# Patient Record
Sex: FEMALE | Race: WHITE | URBAN - NONMETROPOLITAN AREA
[De-identification: names, ages, dates, MRNs, and addresses within clinical notes are randomized per-mention and may not be internally consistent; named-entity substitution may affect disease eponyms.]

---

## 2020-11-24 ENCOUNTER — POST-OPERATIVE VISIT (OUTPATIENT)
Dept: URBAN - NONMETROPOLITAN AREA CLINIC 19 | Facility: CLINIC | Age: 75
End: 2020-11-24
Payer: MEDICARE

## 2020-11-24 DIAGNOSIS — Z96.1 PRESENCE OF INTRAOCULAR LENS: Primary | ICD-10-CM

## 2020-11-24 ASSESSMENT — VISUAL ACUITY
OS: 20/25
OD: 20/25

## 2020-11-24 ASSESSMENT — INTRAOCULAR PRESSURE
OD: 14
OS: 14

## 2020-11-24 NOTE — IMPRESSION/PLAN
Impression: S/P CE/Premium IOL OS - 62 Days. Presence of intraocular lens  Z96.1. Post operative instructions reviewed - Plan: Patient is doing  well status post CE IOL with Lensx /multi-focal IOL, however there is residual postoperative refractive error. We discussed that this can be corrected with [address]  3 months after date of surgery,  Patient to return with Dr Raina Parra for Sumit 86 eval in 1-2 months.

## 2021-01-18 ENCOUNTER — OFFICE VISIT (OUTPATIENT)
Dept: URBAN - METROPOLITAN AREA CLINIC 91 | Facility: CLINIC | Age: 76
End: 2021-01-18
Payer: MEDICARE

## 2021-01-18 DIAGNOSIS — H53.15 VISUAL DISTORTIONS OF SHAPE AND SIZE: Primary | ICD-10-CM

## 2021-01-18 PROCEDURE — 68761 CLOSE TEAR DUCT OPENING: CPT | Performed by: SPECIALIST

## 2021-01-18 PROCEDURE — 92025 CPTRIZED CORNEAL TOPOGRAPHY: CPT | Performed by: SPECIALIST

## 2021-01-18 PROCEDURE — 99213 OFFICE O/P EST LOW 20 MIN: CPT | Performed by: SPECIALIST

## 2021-01-18 NOTE — IMPRESSION/PLAN
Impression: Other secondary cataract, bilateral: H26.493. Plan: Discussed posterior capsular opacification. Due to the fact that PCO is advanced and affecting the patient's vision and quality of life, YAG capsulotomy will be scheduled. All risks, benefits and alternatives were explained in detail including: possibility of retinal detachment, dislocated IOL, loss of vision or eye and possible need for further surgery.

## 2021-01-18 NOTE — IMPRESSION/PLAN
Impression: Visual distortions of shape and size: H53.15. Plan: Explained will try Yag Cap OS before proceed with Sumit Eid.  Patient would like to improve intermediate vision

## 2021-01-18 NOTE — IMPRESSION/PLAN
Impression: Dry eye syndrome of bilateral lacrimal glands: H04.123. Plan: For dry eye syndrome, I have recommended patient use a good quality artificial tear 4-6 times per day. Patient decided to proceed with Punctal plugs today.

## 2021-02-01 ENCOUNTER — PROCEDURE (OUTPATIENT)
Dept: URBAN - METROPOLITAN AREA CLINIC 91 | Facility: CLINIC | Age: 76
End: 2021-02-01
Payer: MEDICARE

## 2021-02-01 DIAGNOSIS — H26.492 OTHER SECONDARY CATARACT, LEFT EYE: Primary | ICD-10-CM

## 2021-02-01 PROCEDURE — 66821 AFTER CATARACT LASER SURGERY: CPT | Performed by: SPECIALIST

## 2021-02-01 ASSESSMENT — INTRAOCULAR PRESSURE
OS: 13
OD: 13

## 2021-03-02 ENCOUNTER — POST-OPERATIVE VISIT (OUTPATIENT)
Dept: URBAN - NONMETROPOLITAN AREA CLINIC 19 | Facility: CLINIC | Age: 76
End: 2021-03-02
Payer: MEDICARE

## 2021-03-02 PROCEDURE — 99024 POSTOP FOLLOW-UP VISIT: CPT | Performed by: OPHTHALMOLOGY

## 2021-03-02 ASSESSMENT — VISUAL ACUITY
OS: 20/20
OD: 20/20

## 2021-03-02 ASSESSMENT — INTRAOCULAR PRESSURE
OD: 13
OS: 13

## 2021-03-02 NOTE — IMPRESSION/PLAN
Impression: S/P s/p yag cap laser OS - . Presence of intraocular lens  Z96.1. Excellent post op course   Post operative instructions reviewed - Condition is improving - Plan: Patient is doing well s/p yag capsulotomy OS with no complications at this time. Vision remains blurred despite the yag cap and treatment for dry eye.   Patient is interested in correcting mild residual refractive error OS

## 2021-03-16 ENCOUNTER — OFFICE VISIT (OUTPATIENT)
Dept: URBAN - METROPOLITAN AREA CLINIC 91 | Facility: CLINIC | Age: 76
End: 2021-03-16
Payer: MEDICARE

## 2021-03-16 DIAGNOSIS — H26.493 OTHER SECONDARY CATARACT, BILATERAL: ICD-10-CM

## 2021-03-16 PROCEDURE — 68761 CLOSE TEAR DUCT OPENING: CPT | Performed by: SPECIALIST

## 2021-03-16 PROCEDURE — 99214 OFFICE O/P EST MOD 30 MIN: CPT | Performed by: SPECIALIST

## 2021-03-16 PROCEDURE — 92025 CPTRIZED CORNEAL TOPOGRAPHY: CPT | Performed by: SPECIALIST

## 2021-03-16 RX ORDER — CYCLOSPORINE 0.5 MG/ML
0.05 % EMULSION OPHTHALMIC
Qty: 180 | Refills: 4 | Status: ACTIVE
Start: 2021-03-16

## 2021-03-16 NOTE — IMPRESSION/PLAN
Impression: Dry eye syndrome of bilateral lacrimal glands: H04.123. Plan: For dry eye syndrome, I have recommended patient use a good quality artificial tear 4-6 times per day.  I have also discussed patient to start topical Restasis drops BID OU

## 2021-03-16 NOTE — IMPRESSION/PLAN
Impression: Visual distortions of shape and size: H53.15. Plan: Intermittent Blurriness is most likely due to Dry Eye Syndrome. I have explained that i do not recommend proceeding with any refractive surgery at this time. Discussed with patient to start Dry eye treatment with punctal plugs and restasis drops.

## 2021-06-22 ENCOUNTER — OFFICE VISIT (OUTPATIENT)
Dept: URBAN - METROPOLITAN AREA CLINIC 91 | Facility: CLINIC | Age: 76
End: 2021-06-22
Payer: MEDICARE

## 2021-06-22 DIAGNOSIS — H04.123 DRY EYE SYNDROME OF BILATERAL LACRIMAL GLANDS: ICD-10-CM

## 2021-06-22 DIAGNOSIS — H26.491 OTHER SECONDARY CATARACT, RIGHT EYE: Primary | ICD-10-CM

## 2021-06-22 PROCEDURE — 68761 CLOSE TEAR DUCT OPENING: CPT | Performed by: SPECIALIST

## 2021-06-22 PROCEDURE — 99213 OFFICE O/P EST LOW 20 MIN: CPT | Performed by: SPECIALIST

## 2021-06-22 ASSESSMENT — INTRAOCULAR PRESSURE
OD: 15
OS: 14

## 2021-06-22 NOTE — IMPRESSION/PLAN
Impression: Dry eye syndrome of bilateral lacrimal glands: H04.123. Plan: For dry eye syndrome, I have recommended patient use a good quality artificial tear 4-6 times per day. Recommend plugs BLL, patient would like to proceed, consent signed. yes

## 2022-06-21 ENCOUNTER — OFFICE VISIT (OUTPATIENT)
Dept: URBAN - NONMETROPOLITAN AREA CLINIC 19 | Facility: CLINIC | Age: 77
End: 2022-06-21
Payer: MEDICARE

## 2022-06-21 DIAGNOSIS — H02.88A MEIBOMIAN GLAND DYSFNCT RIGHT EYE, UPPER AND LOWER EYELIDS: ICD-10-CM

## 2022-06-21 DIAGNOSIS — H02.88B MEIBOMIAN GLAND DYSFUNCTION LEFT EYE, UPPER AND LOWER EYELIDS: ICD-10-CM

## 2022-06-21 DIAGNOSIS — H16.223 BILATERAL KERATOCONJUNCTIVITIS SICCA, NOT SPECIFIED AS SJOGREN'S: Primary | ICD-10-CM

## 2022-06-21 PROCEDURE — 99213 OFFICE O/P EST LOW 20 MIN: CPT | Performed by: OPHTHALMOLOGY

## 2022-06-21 RX ORDER — CYCLOSPORINE 0.5 MG/ML
0.05 % EMULSION OPHTHALMIC
Qty: 180 | Refills: 4 | Status: ACTIVE
Start: 2022-06-21

## 2022-06-21 ASSESSMENT — INTRAOCULAR PRESSURE
OS: 10
OD: 13

## 2022-06-21 NOTE — IMPRESSION/PLAN
Impression: Meibomian gland dysfunction left eye, upper and lower eyelids: H02.88B. 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 The chronic nature of the condition and its association with evaporative dry eye was emphasized. The various etiologies were discussed to include: androgen deficiency, certain medications, CLW, the normal bacterial sofiya mechanisms of contribution, rosacea and lifestyle. Recommended the patient do daily warm compresses and lid scrubs, Artificial tears qid and prn. A topical or oral antibiotic and lipiflow were discussed as additional treatment options.

## 2022-06-21 NOTE — IMPRESSION/PLAN
Impression: Bilateral keratoconjunctivitis sicca, not specified as Sjogren's: V36.566. Plan: The patient was educated on their the severity of their dry eye condition. Therapy with artificial tears and night time ointment have been tried and failed. I Recommend that the patient use antiinflammatory therapy, such as  Restasis or Xiidra bid OU, as well as continue artificial tears qid and prn with ointment qhs. The patient understands that these medications may burn and that they take between 1-3 months for maximum efficacy.

## 2022-09-13 ENCOUNTER — OFFICE VISIT (OUTPATIENT)
Dept: URBAN - NONMETROPOLITAN AREA CLINIC 19 | Facility: CLINIC | Age: 77
End: 2022-09-13
Payer: MEDICARE

## 2022-09-13 DIAGNOSIS — H02.88A MEIBOMIAN GLAND DYSFNCT RIGHT EYE, UPPER AND LOWER EYELIDS: Primary | ICD-10-CM

## 2022-09-13 DIAGNOSIS — H16.223 BILATERAL KERATOCONJUNCTIVITIS SICCA, NOT SPECIFIED AS SJOGREN'S: ICD-10-CM

## 2022-09-13 DIAGNOSIS — H02.88B MEIBOMIAN GLAND DYSFUNCTION LEFT EYE, UPPER AND LOWER EYELIDS: ICD-10-CM

## 2022-09-13 PROCEDURE — 99213 OFFICE O/P EST LOW 20 MIN: CPT | Performed by: OPHTHALMOLOGY

## 2022-09-13 ASSESSMENT — VISUAL ACUITY
OD: 20/25
OS: 20/25

## 2022-09-13 NOTE — IMPRESSION/PLAN
Impression: Meibomian gland dysfunction left eye, upper and lower eyelids: H02.88B. Plan: MGD slightly improved on exam today. Recommend use of eye mask. Explained eye massage would be helpful as well. Discussed use of 1-2 grams of fish oil. Pt unable to tolerate Restasis. She may discontinue. Option of Lipiflow discussed. May use Retaine MGD. The chronic nature of the condition and its association with evaporative dry eye was emphasized. The various etiologies were discussed to include: androgen deficiency, certain medications, CLW, the normal bacterial sofiya mechanisms of contribution, rosacea and lifestyle. Recommended the patient do daily warm compresses and lid scrubs, Artificial tears qid and prn. A topical or oral antibiotic and lipiflow were discussed as additional treatment options.

## 2022-09-13 NOTE — IMPRESSION/PLAN
Impression: Bilateral keratoconjunctivitis sicca, not specified as Sjogren's: H16.223 Bilateral. Plan: Dry eye finding were discussed with the patient. Prefers not to use Restasis due to burning. We reviewed the multifactorial nature of dye eye and their contributions to dry eye. Appropriate lifestyle modifications were discussed. I have recommended patient use a good quality artificial tear qid and prn with gel tear or ointment qhs. The need to treat daily even if asymptomatic was emphasized. I have also discussed alternatives such as placement of punctal plugs.